# Patient Record
Sex: FEMALE | Race: WHITE | NOT HISPANIC OR LATINO | URBAN - METROPOLITAN AREA
[De-identification: names, ages, dates, MRNs, and addresses within clinical notes are randomized per-mention and may not be internally consistent; named-entity substitution may affect disease eponyms.]

---

## 2021-03-10 ENCOUNTER — LAB OUTSIDE AN ENCOUNTER (OUTPATIENT)
Dept: URBAN - METROPOLITAN AREA CLINIC 78 | Facility: CLINIC | Age: 66
End: 2021-03-10

## 2021-03-10 ENCOUNTER — OFFICE VISIT (OUTPATIENT)
Dept: URBAN - METROPOLITAN AREA CLINIC 78 | Facility: CLINIC | Age: 66
End: 2021-03-10
Payer: COMMERCIAL

## 2021-03-10 VITALS
SYSTOLIC BLOOD PRESSURE: 114 MMHG | TEMPERATURE: 97.4 F | DIASTOLIC BLOOD PRESSURE: 71 MMHG | HEIGHT: 66 IN | BODY MASS INDEX: 26.07 KG/M2 | WEIGHT: 162.2 LBS | HEART RATE: 61 BPM

## 2021-03-10 DIAGNOSIS — K58.1 IRRITABLE BOWEL SYNDROME WITH CONSTIPATION: ICD-10-CM

## 2021-03-10 DIAGNOSIS — Z86.010 HISTORY OF ADENOMATOUS POLYP OF COLON: ICD-10-CM

## 2021-03-10 DIAGNOSIS — K21.9 CHRONIC GERD: ICD-10-CM

## 2021-03-10 DIAGNOSIS — R10.13 EPIGASTRIC PAIN: ICD-10-CM

## 2021-03-10 PROBLEM — 235595009: Status: ACTIVE | Noted: 2021-03-10

## 2021-03-10 PROBLEM — 440630006: Status: ACTIVE | Noted: 2021-03-10

## 2021-03-10 PROCEDURE — 3017F COLORECTAL CA SCREEN DOC REV: CPT | Performed by: INTERNAL MEDICINE

## 2021-03-10 PROCEDURE — 1036F TOBACCO NON-USER: CPT | Performed by: INTERNAL MEDICINE

## 2021-03-10 PROCEDURE — G8427 DOCREV CUR MEDS BY ELIG CLIN: HCPCS | Performed by: INTERNAL MEDICINE

## 2021-03-10 PROCEDURE — 99214 OFFICE O/P EST MOD 30 MIN: CPT | Performed by: INTERNAL MEDICINE

## 2021-03-10 PROCEDURE — G9903 PT SCRN TBCO ID AS NON USER: HCPCS | Performed by: INTERNAL MEDICINE

## 2021-03-10 PROCEDURE — G9622 NO UNHEAL ETOH USER: HCPCS | Performed by: INTERNAL MEDICINE

## 2021-03-10 PROCEDURE — G8420 CALC BMI NORM PARAMETERS: HCPCS | Performed by: INTERNAL MEDICINE

## 2021-03-10 RX ORDER — PANTOPRAZOLE SODIUM 40 MG/1
1 TABLET TABLET, DELAYED RELEASE ORAL ONCE A DAY
Qty: 90 TABLET | Refills: 1 | OUTPATIENT
Start: 2021-03-10

## 2021-03-10 RX ORDER — SODIUM, POTASSIUM,MAG SULFATES 17.5-3.13G
17.5-13.3-1.6 GM/177ML SOLUTION, RECONSTITUTED, ORAL ORAL AS DIRECTED
Qty: 354 MILLILITER | OUTPATIENT
Start: 2021-03-10 | End: 2021-03-11

## 2021-03-10 NOTE — PREVIOUS WORKUP REVIEWED
:ENDOSCOPIES:-Colonoscopy 2/25/2019: Normal TI. A 6 mm polyp in the sigmoid colon. Diverticulosis in the sigmoid colon. Internal hemorrhoids.*Pathology: Sigmoid colon polyp-tubular adenoma.-Colonoscopy 3/17/2016: 1.2 cm flat polyp in the transverse colon. Diverticulosis. Hemorrhoids. Angulated colon suggesting adhesions.*Pathology: Transverse colon polyp-tubular adenoma.-EGD 11/4/2014: Reflux esophagitis with possible Ley's. Hiatal hernia. Focal ulcer at the fundus and antrum.-Colonoscopy 11/4/2014: A 1.5 cm polyp in the sigmoid colon. Diverticulosis. Hemorrhoids.*Pathology: Gastric antrum-acute erosive gastritis. Gastric fundus-focal loss of surface mucosa. Distal esophagus-chronic esophagitis, negative for intestinal metaplasia. Sigmoid colon polyp-tubular adenoma.-EGD 4/21/2012: Reflux esophagitis with possible Ley's. Hiatal hernia. Nonspecific gastritis.-Colonoscopy 4/21/2012: A 2 cm flat polyp in the ascending colon. Removed piecemeal fashion. A 1 cm polyp in the descending colon. Angulated colon. Hemorrhoids.*Pathology: Duodenum-normal. Gastric antrum-H. pylori chronic active gastritis. Distal esophagus-chronic esophagitis. Ascending colon polyp-tubular adenoma. TI-normal. Random colon-melanosis coli. Descending colon polyp-tubular adenoma.-Colonoscopy 6/12/2009: A 8 mm polyp in the sigmoid colon. A 9 mm polyp in descending colon. Diverticulosis in the sigmoid colon. Hemorrhoids.*Pathology: Sigmoid colon polyp-tubular adenoma. Ascending colon polyp-tubular adenoma.-EGD 3/23/2007: Reflux esophagitis rule out Ley's. Small hiatal hernia. Focal ulceration at the prepyloric area.-Colonoscopy 3/23/2007: Diverticulosis. A 5 mm polyp in the sigmoid colon. Hemorrhoids. Angulated colon.*Pathology: Duodenum-normal. Gastric antrum-chronic gastritis, reactive gastropathy. Distal esophagus-chronic esophagitis. No intestinal metaplasia. Sigmoid colon polyp-tubular adenoma.-EGD 10/7/2004: Rule out Ley's esophagus, hiatal hernia, gastritis.*Pathology: Duodenum-normal. Gastric antrum-chronic inactive gastritis without H. pylori. Distal esophagus-moderate chronic inflammation, negative intestinal metaplasia.-EGD 8/9/2004: Reflux esophagitis with possible Ley's. Small hiatal hernia. Gastritis.*Pathology: Gastric antrum-chronic gastritis, H. pylori positivity. Duodenum-normal. Distal esophagus-chronic active gastritis, negative intestinal metaplasia. No squamous mucosa present. Positive JOSE E test.

## 2021-03-10 NOTE — HPI-TODAY'S VISIT:
65-year-old  female presents for chronic GERD, history of H. pylori infection, history of advanced adenomatous colon polyps.  Last colonoscopy was 2 years ago.  She was recommended to have repeat colonoscopy in 2 years.  Multiple colonoscopies done with Dr. Loving.  Multiple large tubular adenomas were removed in the past.  No family history of colon cancer. She also has chronic GERD.  She was on Dexilant before then changed to over-the-counter medication.  She thinks this is not working as good as prescription medication.  Epigastric pain is reported. Chronic constipation.  1 bowel movements every week or 2.  If jalyn every 3-4 days.  For the past 1 month she tried natural detox program.  She has moved bowel every day with this.

## 2021-03-29 PROBLEM — 429047008: Status: ACTIVE | Noted: 2021-03-10

## 2021-04-01 ENCOUNTER — OFFICE VISIT (OUTPATIENT)
Dept: URBAN - METROPOLITAN AREA LAB 3 | Facility: LAB | Age: 66
End: 2021-04-01
Payer: COMMERCIAL

## 2021-04-01 DIAGNOSIS — D12.2 ADENOMA OF ASCENDING COLON: ICD-10-CM

## 2021-04-01 DIAGNOSIS — Z86.010 H/O ADENOMATOUS POLYP OF COLON: ICD-10-CM

## 2021-04-01 PROCEDURE — 45385 COLONOSCOPY W/LESION REMOVAL: CPT | Performed by: INTERNAL MEDICINE

## 2021-04-01 RX ORDER — PANTOPRAZOLE SODIUM 40 MG/1
1 TABLET TABLET, DELAYED RELEASE ORAL ONCE A DAY
Qty: 90 TABLET | Refills: 1 | Status: ACTIVE | COMMUNITY
Start: 2021-03-10

## 2021-04-08 ENCOUNTER — LAB OUTSIDE AN ENCOUNTER (OUTPATIENT)
Dept: URBAN - METROPOLITAN AREA CLINIC 78 | Facility: CLINIC | Age: 66
End: 2021-04-08

## 2021-04-09 LAB — H PYLORI BREATH TEST: NEGATIVE

## 2021-06-29 ENCOUNTER — TELEPHONE ENCOUNTER (OUTPATIENT)
Dept: URBAN - METROPOLITAN AREA CLINIC 77 | Facility: CLINIC | Age: 66
End: 2021-06-29

## 2021-12-31 ENCOUNTER — ERX REFILL RESPONSE (OUTPATIENT)
Dept: URBAN - METROPOLITAN AREA CLINIC 78 | Facility: CLINIC | Age: 66
End: 2021-12-31

## 2021-12-31 RX ORDER — PANTOPRAZOLE SODIUM 40 MG/1
1 TABLET TABLET, DELAYED RELEASE ORAL ONCE A DAY
Qty: 90 TABLET | Refills: 1 | OUTPATIENT

## 2021-12-31 RX ORDER — PANTOPRAZOLE SODIUM 40 MG/1
TAKE ONE TABLET BY MOUTH DAILY TABLET, DELAYED RELEASE ORAL
Qty: 90 TABLET | Refills: 2 | OUTPATIENT

## 2023-05-17 ENCOUNTER — WEB ENCOUNTER (OUTPATIENT)
Dept: URBAN - METROPOLITAN AREA CLINIC 78 | Facility: CLINIC | Age: 68
End: 2023-05-17

## 2023-05-17 RX ORDER — PANTOPRAZOLE SODIUM 40 MG/1
TAKE ONE TABLET BY MOUTH DAILY TABLET, DELAYED RELEASE ORAL ONCE A DAY
Qty: 90 TABLET | Refills: 0

## 2023-05-21 ENCOUNTER — WEB ENCOUNTER (OUTPATIENT)
Dept: URBAN - METROPOLITAN AREA CLINIC 78 | Facility: CLINIC | Age: 68
End: 2023-05-21

## 2024-03-21 ENCOUNTER — OV EP (OUTPATIENT)
Dept: URBAN - METROPOLITAN AREA CLINIC 78 | Facility: CLINIC | Age: 69
End: 2024-03-21

## 2024-04-25 ENCOUNTER — OV EP (OUTPATIENT)
Dept: URBAN - METROPOLITAN AREA CLINIC 78 | Facility: CLINIC | Age: 69
End: 2024-04-25

## 2024-04-25 ENCOUNTER — LAB (OUTPATIENT)
Dept: URBAN - METROPOLITAN AREA CLINIC 78 | Facility: CLINIC | Age: 69
End: 2024-04-25

## 2024-04-25 VITALS
TEMPERATURE: 97.9 F | SYSTOLIC BLOOD PRESSURE: 125 MMHG | HEART RATE: 60 BPM | WEIGHT: 169 LBS | DIASTOLIC BLOOD PRESSURE: 70 MMHG | BODY MASS INDEX: 27.16 KG/M2 | HEIGHT: 66 IN

## 2024-04-25 RX ORDER — POLYETHYLENE GLYCOL-3350 AND ELECTROLYTES WITH FLAVOR PACK 240; 5.84; 2.98; 6.72; 22.72 G/278.26G; G/278.26G; G/278.26G; G/278.26G; G/278.26G
4000 ML POWDER, FOR SOLUTION ORAL AS DIRECTED
Qty: 1 | Refills: 0 | OUTPATIENT
Start: 2024-04-25 | End: 2024-04-26

## 2024-04-25 RX ORDER — PANTOPRAZOLE SODIUM 40 MG/1
1 TABLET, 30 MINS BEFORE A MEAL TABLET, DELAYED RELEASE ORAL ONCE A DAY
Qty: 90 | Refills: 3 | OUTPATIENT
Start: 2024-04-25

## 2024-04-25 RX ORDER — SUCRALFATE 1 G/1
1 TABLET ON AN EMPTY STOMACH TABLET ORAL TWICE A DAY
Qty: 60 | OUTPATIENT
Start: 2024-04-25 | End: 2024-05-25

## 2024-04-25 RX ORDER — PANTOPRAZOLE SODIUM 40 MG/1
TAKE ONE TABLET BY MOUTH DAILY TABLET, DELAYED RELEASE ORAL ONCE A DAY
Qty: 90 TABLET | Refills: 0 | Status: ACTIVE | COMMUNITY

## 2024-04-25 NOTE — HPI-TODAY'S VISIT:
68-year-old female presents for follow-up of adenomatous colon polyps, chronic GERD, chronic epigastric/periumbilical pain. Reflux symptoms well-controlled with esomeprazole prescribed by PCP.  Constant periumbilical pain many years.  In the past Dr. Loving prescribed a medication, took for 9 days, the pain was disappeared about a few minutes later it has come back. Denies unintentional weight loss, dysphagia, blood in stool or melena, nausea vomiting. She consulted her insurance company, eligibility over the colonoscopy, surveillance.  She was told 4 years at minimum. Patient has constipation, IBS-C, currently it is "mild". Takes Aleve a few times a week for muscle pain.

## 2024-05-08 ENCOUNTER — OUT OF OFFICE VISIT (OUTPATIENT)
Dept: URBAN - METROPOLITAN AREA SURGERY CENTER 15 | Facility: SURGERY CENTER | Age: 69
End: 2024-05-08
Payer: COMMERCIAL

## 2024-05-08 ENCOUNTER — CLAIMS CREATED FROM THE CLAIM WINDOW (OUTPATIENT)
Dept: URBAN - METROPOLITAN AREA CLINIC 4 | Facility: CLINIC | Age: 69
End: 2024-05-08
Payer: COMMERCIAL

## 2024-05-08 DIAGNOSIS — Z86.010 ADENOMAS PERSONAL HISTORY OF COLONIC POLYPS: ICD-10-CM

## 2024-05-08 DIAGNOSIS — Z12.11 COLON CANCER SCREENING: ICD-10-CM

## 2024-05-08 DIAGNOSIS — K63.5 COLON POLYP: ICD-10-CM

## 2024-05-08 DIAGNOSIS — D12.3 ADENOMA OF TRANSVERSE COLON: ICD-10-CM

## 2024-05-08 DIAGNOSIS — D12.3 BENIGN NEOPLASM OF TRANSVERSE COLON: ICD-10-CM

## 2024-05-08 PROCEDURE — 45385 COLONOSCOPY W/LESION REMOVAL: CPT | Performed by: INTERNAL MEDICINE

## 2024-05-08 PROCEDURE — 88305 TISSUE EXAM BY PATHOLOGIST: CPT | Performed by: PATHOLOGY

## 2024-05-08 PROCEDURE — 00811 ANES LWR INTST NDSC NOS: CPT | Performed by: NURSE ANESTHETIST, CERTIFIED REGISTERED

## 2024-05-08 PROCEDURE — G8907 PT DOC NO EVENTS ON DISCHARG: HCPCS | Performed by: INTERNAL MEDICINE

## 2024-05-08 RX ORDER — PANTOPRAZOLE SODIUM 40 MG/1
TAKE ONE TABLET BY MOUTH DAILY TABLET, DELAYED RELEASE ORAL ONCE A DAY
Qty: 90 TABLET | Refills: 0 | Status: ACTIVE | COMMUNITY

## 2024-05-08 RX ORDER — PANTOPRAZOLE SODIUM 40 MG/1
1 TABLET, 30 MINS BEFORE A MEAL TABLET, DELAYED RELEASE ORAL ONCE A DAY
Qty: 90 | Refills: 3 | Status: ACTIVE | COMMUNITY
Start: 2024-04-25

## 2024-05-08 RX ORDER — SUCRALFATE 1 G/1
1 TABLET ON AN EMPTY STOMACH TABLET ORAL TWICE A DAY
Qty: 60 | Status: ACTIVE | COMMUNITY
Start: 2024-04-25 | End: 2024-05-25

## 2024-05-09 ENCOUNTER — DASHBOARD ENCOUNTERS (OUTPATIENT)
Age: 69
End: 2024-05-09

## 2024-08-04 ENCOUNTER — WEB ENCOUNTER (OUTPATIENT)
Dept: URBAN - METROPOLITAN AREA CLINIC 78 | Facility: CLINIC | Age: 69
End: 2024-08-04